# Patient Record
Sex: MALE | Race: BLACK OR AFRICAN AMERICAN | NOT HISPANIC OR LATINO | URBAN - NONMETROPOLITAN AREA
[De-identification: names, ages, dates, MRNs, and addresses within clinical notes are randomized per-mention and may not be internally consistent; named-entity substitution may affect disease eponyms.]

---

## 2021-09-16 ENCOUNTER — IMPORTED ENCOUNTER (OUTPATIENT)
Dept: URBAN - NONMETROPOLITAN AREA CLINIC 1 | Facility: CLINIC | Age: 51
End: 2021-09-16

## 2021-09-16 PROBLEM — H52.13: Noted: 2021-09-16

## 2021-09-16 PROBLEM — H52.4: Noted: 2021-09-16

## 2021-09-16 PROCEDURE — S0620 ROUTINE OPHTHALMOLOGICAL EXA: HCPCS

## 2021-09-16 NOTE — PATIENT DISCUSSION
Myopia/Presbyopia OUDiscussed refractive status in detail with patient. New glasses Rx given todayContinue to monitor.

## 2022-04-15 ASSESSMENT — TONOMETRY
OD_IOP_MMHG: 18
OS_IOP_MMHG: 18

## 2022-04-15 ASSESSMENT — VISUAL ACUITY
OS_CC: 20/20
OD_PH: 20/30+2
OD_CC: 20/60

## 2022-09-20 ENCOUNTER — ESTABLISHED PATIENT (OUTPATIENT)
Dept: RURAL CLINIC 3 | Facility: CLINIC | Age: 52
End: 2022-09-20

## 2022-09-20 DIAGNOSIS — H52.13: ICD-10-CM

## 2022-09-20 DIAGNOSIS — H52.4: ICD-10-CM

## 2022-09-20 PROCEDURE — 92014 COMPRE OPH EXAM EST PT 1/>: CPT

## 2022-09-20 PROCEDURE — 92015 DETERMINE REFRACTIVE STATE: CPT

## 2022-09-20 ASSESSMENT — TONOMETRY
OS_IOP_MMHG: 12
OD_IOP_MMHG: 13

## 2022-09-20 ASSESSMENT — VISUAL ACUITY
OD_CC: 20/20
OS_CC: 20/20-

## 2023-11-17 ENCOUNTER — ESTABLISHED PATIENT (OUTPATIENT)
Dept: RURAL CLINIC 3 | Facility: CLINIC | Age: 53
End: 2023-11-17

## 2023-11-17 DIAGNOSIS — H52.13: ICD-10-CM

## 2023-11-17 DIAGNOSIS — H25.13: ICD-10-CM

## 2023-11-17 PROCEDURE — 92015 DETERMINE REFRACTIVE STATE: CPT

## 2023-11-17 PROCEDURE — 99214 OFFICE O/P EST MOD 30 MIN: CPT

## 2023-11-17 ASSESSMENT — TONOMETRY
OS_IOP_MMHG: 18
OD_IOP_MMHG: 18

## 2023-11-17 ASSESSMENT — VISUAL ACUITY
OD_CC: 20/20-2
OU_CC: 20/20
OS_CC: 20/20